# Patient Record
Sex: FEMALE | Race: WHITE | NOT HISPANIC OR LATINO | Employment: FULL TIME | URBAN - NONMETROPOLITAN AREA
[De-identification: names, ages, dates, MRNs, and addresses within clinical notes are randomized per-mention and may not be internally consistent; named-entity substitution may affect disease eponyms.]

---

## 2017-06-06 ENCOUNTER — OFFICE VISIT (OUTPATIENT)
Dept: FAMILY MEDICINE CLINIC | Facility: CLINIC | Age: 42
End: 2017-06-06

## 2017-06-06 VITALS
WEIGHT: 160 LBS | BODY MASS INDEX: 27.31 KG/M2 | OXYGEN SATURATION: 99 % | HEIGHT: 64 IN | HEART RATE: 74 BPM | SYSTOLIC BLOOD PRESSURE: 110 MMHG | DIASTOLIC BLOOD PRESSURE: 70 MMHG

## 2017-06-06 DIAGNOSIS — Z00.00 EXAMINATION, GENERAL MEDICAL: Primary | ICD-10-CM

## 2017-06-06 DIAGNOSIS — Z23 NEED FOR VACCINATION: ICD-10-CM

## 2017-06-06 PROCEDURE — 90691 TYPHOID VACCINE IM: CPT | Performed by: FAMILY MEDICINE

## 2017-06-06 PROCEDURE — 90472 IMMUNIZATION ADMIN EACH ADD: CPT | Performed by: FAMILY MEDICINE

## 2017-06-06 PROCEDURE — 90471 IMMUNIZATION ADMIN: CPT | Performed by: FAMILY MEDICINE

## 2017-06-06 PROCEDURE — 90632 HEPA VACCINE ADULT IM: CPT | Performed by: FAMILY MEDICINE

## 2017-06-06 PROCEDURE — 99212 OFFICE O/P EST SF 10 MIN: CPT | Performed by: FAMILY MEDICINE

## 2017-06-06 RX ORDER — CIPROFLOXACIN 500 MG/1
TABLET, FILM COATED ORAL
Qty: 10 TABLET | Refills: 0 | Status: SHIPPED | OUTPATIENT
Start: 2017-06-06 | End: 2017-06-20

## 2017-06-06 NOTE — PATIENT INSTRUCTIONS
I discussed food and water precautions.  I discussed blood borne pathogen avoidance.  I discussed indications for use of antibiotics for traveler's diarrhea.  I discussed avoidance of Rabies by avoiding the carriers.  I prescribed an antibiotic for traveler's diarrhea.    Hepatitis A and typhoid vaccines administered.

## 2017-06-06 NOTE — PROGRESS NOTES
Subjective   Muna Brumfield is a 41 y.o. female.     History of Present Illness Patient comes in for a traveler's health visit. They are going to [facility] for 10 days on a mission trip.She will need Hepatitis A and Typhoid vaccines.    The following portions of the patient's history were reviewed and updated as appropriate: allergies, current medications, past family history, past medical history, past social history, past surgical history and problem list.    Review of Systems    Objective   Physical Exam   Constitutional: She appears well-developed and well-nourished.   HENT:   Head: Normocephalic and atraumatic.   Right Ear: External ear normal.   Left Ear: External ear normal.   Nose: Nose normal.   Mouth/Throat: Oropharynx is clear and moist.   Eyes: Pupils are equal, round, and reactive to light.   Cardiovascular: Normal rate, regular rhythm and normal heart sounds.  Exam reveals no gallop and no friction rub.    No murmur heard.  Pulmonary/Chest: Effort normal and breath sounds normal. No respiratory distress. She has no wheezes. She has no rales.   Vitals reviewed.      Assessment/Plan   Muna was seen today for annual exam.    Diagnoses and all orders for this visit:    Examination, general medical    Need for vaccination    Other orders  -     ciprofloxacin (CIPRO) 500 MG tablet; Take one every 12 hours times three doses for Traveler's Diarrhea.  -     Typhoid VICPS Vaccine Im  -     Hepatitis A Vaccine Adult IM      I discussed food and water precautions.  I discussed blood borne pathogen avoidance.  I discussed indications for use of antibiotics for traveler's diarrhea.  I discussed avoidance of Rabies by avoiding the carriers.  Patient will not need antimalarials due to the location of where she is traveling.  I prescribed an antibiotic for traveler's diarrhea.

## 2017-06-20 ENCOUNTER — PROCEDURE VISIT (OUTPATIENT)
Dept: OBSTETRICS AND GYNECOLOGY | Facility: CLINIC | Age: 42
End: 2017-06-20

## 2017-06-20 VITALS
WEIGHT: 160 LBS | DIASTOLIC BLOOD PRESSURE: 56 MMHG | BODY MASS INDEX: 27.31 KG/M2 | HEIGHT: 64 IN | SYSTOLIC BLOOD PRESSURE: 105 MMHG | HEART RATE: 71 BPM

## 2017-06-20 DIAGNOSIS — N92.1 MENORRHAGIA WITH IRREGULAR CYCLE: ICD-10-CM

## 2017-06-20 DIAGNOSIS — Z01.419 WELL FEMALE EXAM WITH ROUTINE GYNECOLOGICAL EXAM: Primary | ICD-10-CM

## 2017-06-20 DIAGNOSIS — N94.6 DYSMENORRHEA: ICD-10-CM

## 2017-06-20 DIAGNOSIS — Z12.31 SCREENING MAMMOGRAM, ENCOUNTER FOR: ICD-10-CM

## 2017-06-20 DIAGNOSIS — R63.5 WEIGHT GAIN: ICD-10-CM

## 2017-06-20 PROCEDURE — 88142 CYTOPATH C/V THIN LAYER: CPT | Performed by: NURSE PRACTITIONER

## 2017-06-20 PROCEDURE — 87624 HPV HI-RISK TYP POOLED RSLT: CPT | Performed by: NURSE PRACTITIONER

## 2017-06-20 PROCEDURE — 99396 PREV VISIT EST AGE 40-64: CPT | Performed by: NURSE PRACTITIONER

## 2017-06-20 NOTE — PROGRESS NOTES
Subjective   Muna Brumfield is a 41 y.o.  who presents today for an annual GYN visit.      Gynecologic Exam   The patient's primary symptoms include pelvic pain. The patient's pertinent negatives include no genital itching, genital lesions, genital odor, genital rash, missed menses, vaginal bleeding or vaginal discharge. This is a new (Reports having irregular peiods since January and the pain with these periods is more significant than when she was having regular periods.  ) problem. The current episode started more than 1 month ago. The problem has been unchanged. The pain is moderate. The problem affects both sides. She is not pregnant. Pertinent negatives include no abdominal pain, back pain, chills, constipation, dysuria, fever, flank pain, headaches, nausea or painful intercourse. Associated symptoms comments: Lower pelvic pain and cramping. Nothing aggravates the symptoms. She has tried NSAIDs for the symptoms. The treatment provided mild relief. She is sexually active. No, her partner does not have an STD. She uses vasectomy for contraception. Her menstrual history has been irregular (Reports abnormal periods since January.  Prior to January she had regular montly cyles , with 5-7 days of normal bleeding. ). There is no history of an abdominal surgery, an ectopic pregnancy, endometriosis or a gynecological surgery.     Last Mammo- never  Last Pap-  14- normal.  Hx of abnormal pap in  that required a colpo which was normal.   LMP- 2 weeks ago.  Reports she had regular monthly cycles with 5-7 days of moderate bleeding until this January.  Starting in January she has had irregular cycles coming every 2-4 weeks.  She has noticed that her pain, cramping, and bleeding have gotten worse with periods.  She has to take advil routinely during menses to control her pain. She reports that her bleeding is no heavier than usual and would consider it normal.      The following portions of the patient's history  were reviewed and updated as appropriate: allergies, current medications, past family history, past medical history, past social history, past surgical history and problem list.    Review of Systems   Constitutional: Positive for fatigue. Negative for activity change, chills and fever.        Reports that over the last couple of months she has needed to take a nap at least once a day to get through the day. Patient also reports that for the last couple of years she has gained 5lbs a year and recently went up 1 size in her clothes.    Respiratory: Negative for apnea, cough and shortness of breath.    Cardiovascular: Negative for chest pain and palpitations.   Gastrointestinal: Negative for abdominal distention, abdominal pain, constipation, nausea and rectal pain.   Endocrine: Negative for cold intolerance and heat intolerance.        Will occasionally get a hot flash that is not bothersome.    Genitourinary: Positive for menstrual problem and pelvic pain. Negative for difficulty urinating, dyspareunia, dysuria, flank pain, genital sores, missed menses, vaginal bleeding, vaginal discharge and vaginal pain.   Musculoskeletal: Negative for arthralgias, back pain and myalgias.   Skin: Negative for color change.   Neurological: Negative for dizziness, syncope, light-headedness and headaches.   Hematological: Negative for adenopathy. Does not bruise/bleed easily.   Psychiatric/Behavioral: Negative for agitation, decreased concentration and dysphoric mood. The patient is not nervous/anxious and is not hyperactive.        Objective   Physical Exam   Constitutional: She is oriented to person, place, and time. She appears well-developed.   Neck: Normal range of motion.   Cardiovascular: Normal rate and regular rhythm.    Pulmonary/Chest: Effort normal. Right breast exhibits no inverted nipple, no mass, no nipple discharge, no skin change and no tenderness. Left breast exhibits no inverted nipple, no mass, no skin change and  no tenderness. Breasts are symmetrical. There is no breast swelling.   Fibrocystic breast changes noted bilaterally.    Abdominal: Hernia confirmed negative in the right inguinal area and confirmed negative in the left inguinal area.   Genitourinary: No breast tenderness, discharge or bleeding. Pelvic exam was performed with patient supine. No labial fusion. There is no rash, tenderness, lesion or injury on the right labia. There is no rash, tenderness, lesion or injury on the left labia. Uterus is not deviated, not enlarged, not fixed and not tender. Cervix exhibits no motion tenderness, no discharge and no friability. Right adnexum displays no mass, no tenderness and no fullness. Left adnexum displays no mass, no tenderness and no fullness. No erythema, tenderness or bleeding in the vagina. No foreign body in the vagina. No signs of injury around the vagina. Vaginal discharge found.   Genitourinary Comments: Pap smear obtained.  Scant amount of white/yellow cervical discharge noted.    Musculoskeletal: Normal range of motion.   Lymphadenopathy:        Right: No inguinal adenopathy present.        Left: No inguinal adenopathy present.   Neurological: She is alert and oriented to person, place, and time. She has normal reflexes.   Skin: Skin is warm.   Psychiatric: She has a normal mood and affect. Her behavior is normal.   Nursing note and vitals reviewed.        Assessment/Plan   Muna was seen today for gynecologic exam.    Diagnoses and all orders for this visit:    Well female exam with routine gynecological exam  -     Liquid-based Pap Smear, Screening    Screening mammogram, encounter for  -     Mammo Screening Digital Tomosynthesis Bilateral With CAD    Menorrhagia with irregular cycle  -     TSH  -     T3  -     T4, Free  -     US Non-ob Transvaginal; Future    Weight gain  -     TSH  -     T3  -     T4, Free    Dysmenorrhea  -     US Non-ob Transvaginal; Future      Different options to control bleeding  were discussed with the patient.  She declined OCPs because of the effects on her libido.  A pelvic US was also discussed to r/o the possibility of any fibroids, cysts or polyps that could be causing her pain.  The patient was also given information on an endometrial ablation and Essure.  She would like time to consider these options because she is concerned insurance will not cover these procedures.  Thyroid panel will also bed check today to r/o the possibility of a thyroid dysfunction causing her bleeding and recent c/o fatigue and weight gain.

## 2017-06-28 LAB
LAB AP CASE REPORT: NORMAL
LAB AP GYN ADDITIONAL INFORMATION: NORMAL
LAB AP GYN OTHER FINDINGS: NORMAL
Lab: NORMAL
PATH INTERP SPEC-IMP: NORMAL
STAT OF ADQ CVX/VAG CYTO-IMP: NORMAL

## 2017-06-30 LAB — HPV I/H RISK 4 DNA CVX QL PROBE+SIG AMP: NEGATIVE

## 2018-09-04 DIAGNOSIS — Z12.31 ENCOUNTER FOR SCREENING MAMMOGRAM FOR BREAST CANCER: Primary | ICD-10-CM

## 2019-02-26 ENCOUNTER — PROCEDURE VISIT (OUTPATIENT)
Dept: OBSTETRICS AND GYNECOLOGY | Facility: CLINIC | Age: 44
End: 2019-02-26

## 2019-02-26 VITALS
SYSTOLIC BLOOD PRESSURE: 115 MMHG | HEIGHT: 64 IN | DIASTOLIC BLOOD PRESSURE: 68 MMHG | HEART RATE: 78 BPM | BODY MASS INDEX: 26.7 KG/M2 | WEIGHT: 156.4 LBS

## 2019-02-26 DIAGNOSIS — N94.6 DYSMENORRHEA: ICD-10-CM

## 2019-02-26 DIAGNOSIS — Z01.419 ENCOUNTER FOR ANNUAL ROUTINE GYNECOLOGICAL EXAMINATION: Primary | ICD-10-CM

## 2019-02-26 DIAGNOSIS — N81.2 SECOND DEGREE UTERINE PROLAPSE: ICD-10-CM

## 2019-02-26 DIAGNOSIS — R53.83 LETHARGY: ICD-10-CM

## 2019-02-26 DIAGNOSIS — N92.0 MENORRHAGIA WITH REGULAR CYCLE: ICD-10-CM

## 2019-02-26 DIAGNOSIS — F34.1 DYSTHYMIC: ICD-10-CM

## 2019-02-26 DIAGNOSIS — E66.3 OVERWEIGHT (BMI 25.0-29.9): ICD-10-CM

## 2019-02-26 PROCEDURE — 99396 PREV VISIT EST AGE 40-64: CPT | Performed by: OBSTETRICS & GYNECOLOGY

## 2019-02-26 PROCEDURE — 87624 HPV HI-RISK TYP POOLED RSLT: CPT | Performed by: OBSTETRICS & GYNECOLOGY

## 2019-02-26 PROCEDURE — G0123 SCREEN CERV/VAG THIN LAYER: HCPCS | Performed by: OBSTETRICS & GYNECOLOGY

## 2019-02-26 RX ORDER — TRANEXAMIC ACID 650 MG/1
2 TABLET ORAL 3 TIMES DAILY
Qty: 30 TABLET | Refills: 12 | Status: SHIPPED | OUTPATIENT
Start: 2019-02-26 | End: 2022-01-24

## 2019-02-28 LAB
GEN CATEG CVX/VAG CYTO-IMP: NORMAL
LAB AP CASE REPORT: NORMAL
LAB AP GYN ADDITIONAL INFORMATION: NORMAL
PATH INTERP SPEC-IMP: NORMAL
STAT OF ADQ CVX/VAG CYTO-IMP: NORMAL

## 2019-03-04 LAB — HPV I/H RISK 4 DNA CVX QL PROBE+SIG AMP: NEGATIVE

## 2019-03-08 ENCOUNTER — APPOINTMENT (OUTPATIENT)
Dept: LAB | Facility: HOSPITAL | Age: 44
End: 2019-03-08

## 2019-03-08 LAB
25(OH)D3 SERPL-MCNC: 37.6 NG/ML (ref 30–100)
ALBUMIN SERPL-MCNC: 4 G/DL (ref 3.4–4.8)
ALBUMIN/GLOB SERPL: 1.1 G/DL (ref 1.1–1.8)
ALP SERPL-CCNC: 47 U/L (ref 38–126)
ALT SERPL W P-5'-P-CCNC: 23 U/L (ref 9–52)
ANION GAP SERPL CALCULATED.3IONS-SCNC: 6 MMOL/L (ref 5–15)
ARTICHOKE IGE QN: 81 MG/DL (ref 1–129)
AST SERPL-CCNC: 29 U/L (ref 14–36)
BASOPHILS # BLD AUTO: 0.03 10*3/MM3 (ref 0–0.2)
BASOPHILS NFR BLD AUTO: 0.5 % (ref 0–1.5)
BILIRUB SERPL-MCNC: 0.5 MG/DL (ref 0.2–1.3)
BUN BLD-MCNC: 18 MG/DL (ref 7–21)
BUN/CREAT SERPL: 23.7 (ref 7–25)
CALCIUM SPEC-SCNC: 9.2 MG/DL (ref 8.4–10.2)
CHLORIDE SERPL-SCNC: 99 MMOL/L (ref 95–110)
CHOLEST SERPL-MCNC: 158 MG/DL (ref 0–199)
CO2 SERPL-SCNC: 31 MMOL/L (ref 22–31)
CREAT BLD-MCNC: 0.76 MG/DL (ref 0.5–1)
DEPRECATED RDW RBC AUTO: 42 FL (ref 37–54)
EOSINOPHIL # BLD AUTO: 0.06 10*3/MM3 (ref 0–0.4)
EOSINOPHIL NFR BLD AUTO: 0.9 % (ref 0.3–6.2)
ERYTHROCYTE [DISTWIDTH] IN BLOOD BY AUTOMATED COUNT: 12 % (ref 12.3–15.4)
FOLATE SERPL-MCNC: 12.4 NG/ML (ref 2.76–21)
GFR SERPL CREATININE-BSD FRML MDRD: 83 ML/MIN/1.73 (ref 58–135)
GLOBULIN UR ELPH-MCNC: 3.5 GM/DL (ref 2.3–3.5)
GLUCOSE BLD-MCNC: 82 MG/DL (ref 60–100)
HCT VFR BLD AUTO: 40.9 % (ref 34–46.6)
HDLC SERPL-MCNC: 56 MG/DL (ref 60–200)
HGB BLD-MCNC: 14 G/DL (ref 12–15.9)
IMM GRANULOCYTES # BLD AUTO: 0.05 10*3/MM3 (ref 0–0.05)
IMM GRANULOCYTES NFR BLD AUTO: 0.8 % (ref 0–0.5)
LDLC/HDLC SERPL: 1.66 {RATIO} (ref 0–3.22)
LYMPHOCYTES # BLD AUTO: 1.63 10*3/MM3 (ref 0.7–3.1)
LYMPHOCYTES NFR BLD AUTO: 25.3 % (ref 19.6–45.3)
MCH RBC QN AUTO: 32.8 PG (ref 26.6–33)
MCHC RBC AUTO-ENTMCNC: 34.2 G/DL (ref 31.5–35.7)
MCV RBC AUTO: 95.8 FL (ref 79–97)
MONOCYTES # BLD AUTO: 0.74 10*3/MM3 (ref 0.1–0.9)
MONOCYTES NFR BLD AUTO: 11.5 % (ref 5–12)
NEUTROPHILS # BLD AUTO: 3.93 10*3/MM3 (ref 1.4–7)
NEUTROPHILS NFR BLD AUTO: 61 % (ref 42.7–76)
NRBC BLD AUTO-RTO: 0 /100 WBC (ref 0–0)
PLATELET # BLD AUTO: 286 10*3/MM3 (ref 140–450)
PMV BLD AUTO: 9.7 FL (ref 6–12)
POTASSIUM BLD-SCNC: 4.4 MMOL/L (ref 3.5–5.1)
PROT SERPL-MCNC: 7.5 G/DL (ref 6.3–8.6)
RBC # BLD AUTO: 4.27 10*6/MM3 (ref 3.77–5.28)
SODIUM BLD-SCNC: 136 MMOL/L (ref 137–145)
T4 SERPL-MCNC: 6.88 MCG/DL (ref 5.5–11)
TRIGL SERPL-MCNC: 45 MG/DL (ref 20–199)
TSH SERPL DL<=0.05 MIU/L-ACNC: 2.2 MIU/ML (ref 0.46–4.68)
VIT B12 BLD-MCNC: 643 PG/ML (ref 239–931)
WBC NRBC COR # BLD: 6.44 10*3/MM3 (ref 3.4–10.8)

## 2019-03-08 PROCEDURE — 80061 LIPID PANEL: CPT | Performed by: OBSTETRICS & GYNECOLOGY

## 2019-03-08 PROCEDURE — 85025 COMPLETE CBC W/AUTO DIFF WBC: CPT | Performed by: OBSTETRICS & GYNECOLOGY

## 2019-03-08 PROCEDURE — 82306 VITAMIN D 25 HYDROXY: CPT | Performed by: OBSTETRICS & GYNECOLOGY

## 2019-03-08 PROCEDURE — 84436 ASSAY OF TOTAL THYROXINE: CPT | Performed by: OBSTETRICS & GYNECOLOGY

## 2019-03-08 PROCEDURE — 82746 ASSAY OF FOLIC ACID SERUM: CPT | Performed by: OBSTETRICS & GYNECOLOGY

## 2019-03-08 PROCEDURE — 36415 COLL VENOUS BLD VENIPUNCTURE: CPT | Performed by: OBSTETRICS & GYNECOLOGY

## 2019-03-08 PROCEDURE — 82607 VITAMIN B-12: CPT | Performed by: OBSTETRICS & GYNECOLOGY

## 2019-03-08 PROCEDURE — 84443 ASSAY THYROID STIM HORMONE: CPT | Performed by: OBSTETRICS & GYNECOLOGY

## 2019-03-08 PROCEDURE — 84479 ASSAY OF THYROID (T3 OR T4): CPT | Performed by: OBSTETRICS & GYNECOLOGY

## 2019-03-08 PROCEDURE — 80053 COMPREHEN METABOLIC PANEL: CPT | Performed by: OBSTETRICS & GYNECOLOGY

## 2019-03-08 PROCEDURE — 83525 ASSAY OF INSULIN: CPT | Performed by: OBSTETRICS & GYNECOLOGY

## 2019-03-09 LAB
INSULIN SERPL-ACNC: 8.3 UIU/ML (ref 2.6–24.9)
T3RU NFR SERPL: 25 % (ref 24–39)

## 2020-06-23 DIAGNOSIS — Z12.31 ENCOUNTER FOR SCREENING MAMMOGRAM FOR MALIGNANT NEOPLASM OF BREAST: Primary | ICD-10-CM

## 2022-01-24 ENCOUNTER — OFFICE VISIT (OUTPATIENT)
Dept: FAMILY MEDICINE CLINIC | Facility: CLINIC | Age: 47
End: 2022-01-24

## 2022-01-24 VITALS
SYSTOLIC BLOOD PRESSURE: 98 MMHG | HEIGHT: 64 IN | DIASTOLIC BLOOD PRESSURE: 68 MMHG | OXYGEN SATURATION: 99 % | HEART RATE: 81 BPM | BODY MASS INDEX: 28.17 KG/M2 | WEIGHT: 165 LBS

## 2022-01-24 DIAGNOSIS — Z00.00 ANNUAL PHYSICAL EXAM: Primary | ICD-10-CM

## 2022-01-24 DIAGNOSIS — Z13.6 ENCOUNTER FOR LIPID SCREENING FOR CARDIOVASCULAR DISEASE: ICD-10-CM

## 2022-01-24 DIAGNOSIS — Z83.49 FAMILY HISTORY OF THYROID DISORDER: ICD-10-CM

## 2022-01-24 DIAGNOSIS — E55.9 VITAMIN D DEFICIENCY: ICD-10-CM

## 2022-01-24 DIAGNOSIS — Z76.89 ENCOUNTER TO ESTABLISH CARE: ICD-10-CM

## 2022-01-24 DIAGNOSIS — Z13.220 ENCOUNTER FOR LIPID SCREENING FOR CARDIOVASCULAR DISEASE: ICD-10-CM

## 2022-01-24 DIAGNOSIS — Z11.59 NEED FOR HEPATITIS C SCREENING TEST: ICD-10-CM

## 2022-01-24 DIAGNOSIS — Z80.8 FAMILY HISTORY OF SKIN CANCER: ICD-10-CM

## 2022-01-24 DIAGNOSIS — Z12.31 ENCOUNTER FOR SCREENING MAMMOGRAM FOR MALIGNANT NEOPLASM OF BREAST: ICD-10-CM

## 2022-01-24 DIAGNOSIS — E66.3 OVERWEIGHT (BMI 25.0-29.9): ICD-10-CM

## 2022-01-24 DIAGNOSIS — Z12.11 ENCOUNTER FOR SCREENING FOR MALIGNANT NEOPLASM OF COLON: ICD-10-CM

## 2022-01-24 PROCEDURE — 99386 PREV VISIT NEW AGE 40-64: CPT | Performed by: FAMILY MEDICINE

## 2022-01-24 NOTE — PROGRESS NOTES
Chief Complaint  Establish Care and Hemorrhoids (flares up, not current)    Subjective          Muna Brumfield presents to [facility] - [place]  History of Present Illness    Patient presents today to establish care.  She has chronic medical problems including vitamin D deficiency and hemorrhoids.  Patient takes vitamin D supplementation, unsure of exact dose but believes it is somewhere between 5000 to 10,000 units a couple times a week.  Patient has not had any lab work checked time.  Had issues with hemorrhoids, however improved since stopping nuts, berries and seeds.  Patient notes regular bowel movements without straining.  Has bowel movements generally daily.  Does use Aquaphor to help with irritation.    Patient had normal last Pap smear in 2019.  History of HPV with precancerous lesions.  Had procedure in office greater than 25 years ago.  Patient has history of fibrocystic breasts.  Had lumpectomy (final diagnosis fibroadenoma) history of breast cyst requiring evaluation which has resolved.    Past Medical History:   Diagnosis Date   • Breast cyst    • Dysmenorrhea     Improved   • Encounter for gynecological examination (general) (routine) without abnormal findings    • Fibroadenoma of breast    • Fibrocystic breast    • Visit for gynecologic examination    • Babb teeth removed      Past Surgical History:   Procedure Laterality Date   • BREAST BIOPSY     • BREAST CYST ASPIRATION     • BREAST CYST EXCISION     • BREAST SURGERY Right     lumpectomy, right   • COLPOSCOPY      EXAM OF CERVIX W/SCOPE 97887 (1)   Normal   • PAP SMEAR  02/28/2011    Negative   • VAGINAL DELIVERY  08/2006     Family History   Problem Relation Age of Onset   • Hyperlipidemia Mother    • Hypertension Mother    • Hypothyroidism Mother    • Arthritis Mother    • DELORES disease Mother    • Hyperlipidemia Father    • Arthritis Father    • Ovarian cancer Other         Other 3rd degree relative  "()   • Ovarian cancer Maternal Aunt    • No Known Problems Brother    • ADD / ADHD Daughter    • Anxiety disorder Son    • Heart attack Maternal Grandfather    • Stroke Paternal Grandmother      Current Outpatient Medications on File Prior to Visit   Medication Sig Dispense Refill   • [DISCONTINUED] Tranexamic Acid 650 MG tablet Take 2 tablets by mouth 3 (Three) Times a Day. For 5 days each cycle 30 tablet 12     No current facility-administered medications on file prior to visit.     Allergies   Allergen Reactions   • Penicillins      Social History     Socioeconomic History   • Marital status:    Tobacco Use   • Smoking status: Never Smoker   • Smokeless tobacco: Never Used   Substance and Sexual Activity   • Alcohol use: No   • Drug use: No   • Sexual activity: Yes     Birth control/protection: None     She lives in home with , two children, dog and 2 cats.  Patient teaches high school Urdu.    Objective   Vital Signs:   BP 98/68   Pulse 81   Ht 162.6 cm (64\")   Wt 74.8 kg (165 lb)   SpO2 99%   BMI 28.32 kg/m²     Physical Exam  Vitals reviewed.   Constitutional:       General: She is not in acute distress.     Appearance: She is well-developed.   HENT:      Head: Normocephalic and atraumatic.      Right Ear: Tympanic membrane and ear canal normal.      Left Ear: Tympanic membrane and ear canal normal.   Eyes:      Extraocular Movements: Extraocular movements intact.      Conjunctiva/sclera: Conjunctivae normal.      Pupils: Pupils are equal, round, and reactive to light.   Cardiovascular:      Rate and Rhythm: Normal rate and regular rhythm.      Heart sounds: Normal heart sounds. No murmur heard.      Pulmonary:      Effort: Pulmonary effort is normal. No respiratory distress.      Breath sounds: Normal breath sounds. No wheezing or rales.   Abdominal:      Palpations: Abdomen is soft.      Tenderness: There is no abdominal tenderness.   Musculoskeletal:      Cervical back: Neck " supple.   Skin:     General: Skin is warm and dry.      Findings: No rash.   Neurological:      Mental Status: She is alert and oriented to person, place, and time.        Result Review :   The following data was reviewed by: Makenzie George MD on 01/24/2022:      Lab Results   Component Value Date    GLUCOSE 82 03/08/2019    BUN 18 03/08/2019    CREATININE 0.76 03/08/2019    EGFRIFNONA 83 03/08/2019    BCR 23.7 03/08/2019    K 4.4 03/08/2019    CO2 31.0 03/08/2019    CALCIUM 9.2 03/08/2019    ALBUMIN 4.00 03/08/2019    AST 29 03/08/2019    ALT 23 03/08/2019     Lab Results   Component Value Date    WBC 6.44 03/08/2019    HGB 14.0 03/08/2019    HCT 40.9 03/08/2019    MCV 95.8 03/08/2019     03/08/2019     Lab Results   Component Value Date    TSH 2.200 03/08/2019               Assessment and Plan    Diagnoses and all orders for this visit:    1. Annual physical exam (Primary)  -     Lipid Panel; Future  -     CBC & Differential; Future  -     Comprehensive Metabolic Panel; Future  -     Hepatitis C antibody; Future  -     TSH; Future  -     Vitamin D 25 Hydroxy; Future    2. Vitamin D deficiency  -     Vitamin D 25 Hydroxy; Future    3. Overweight (BMI 25.0-29.9)  -     Lipid Panel; Future  -     CBC & Differential; Future  -     Comprehensive Metabolic Panel; Future    4. Encounter for lipid screening for cardiovascular disease  -     Lipid Panel; Future    5. Encounter for screening mammogram for malignant neoplasm of breast  -     Mammo Screening Digital Tomosynthesis Bilateral With CAD; Future    6. Encounter for screening for malignant neoplasm of colon  -     Cologuard - Stool, Per Rectum; Future    7. Family history of skin cancer  -     Ambulatory Referral to Dermatology    8. Family history of thyroid disorder  -     TSH; Future    9. Need for hepatitis C screening test  -     Hepatitis C antibody; Future    10. Encounter to establish care      Patient seen today to establish care.  Annual exam  completed.  Will check CBC, CMP, TSH and lipid panel.  History of D deficiency, continue supplementation.  Will check vitamin D level as well.  Patient's Body mass index is 28.32 kg/m². indicating that she is overweight (BMI 25-29.9). Obesity-related health conditions include the following: dyslipidemias.  Weight is unchanged. BMI is is above average; BMI management plan is completed. We discussed Continuing healthy diet and regular physical activity.  Patient due for breast cancer screening, mammogram ordered.  Patient due for colon cancer screening, options discussed including risk and benefits of each screening test. Cologuard ordered.  No family history of breast or colon cancer.  Patient does have family history of skin cancer, thinks basal cell in father.  Will refer to dermatology for skin check.  Due for hepatitis C screening, antibody ordered today.  Will contact patient with labs and recommendations once available.            Follow Up   Return in about 6 weeks (around 3/7/2022) for  procedure only, gyn exam, 30 mins.  Patient was given instructions and counseling regarding her condition or for health maintenance advice. Please see specific information pulled into the AVS if appropriate.           This document has been electronically signed by Makenzie George MD

## 2022-01-27 ENCOUNTER — PATIENT ROUNDING (BHMG ONLY) (OUTPATIENT)
Dept: FAMILY MEDICINE CLINIC | Facility: CLINIC | Age: 47
End: 2022-01-27

## 2022-02-08 ENCOUNTER — TELEPHONE (OUTPATIENT)
Dept: FAMILY MEDICINE CLINIC | Facility: CLINIC | Age: 47
End: 2022-02-08

## 2022-02-08 NOTE — TELEPHONE ENCOUNTER
Per Dr. George, Ms. Brumfield has been called with recent Screening Mammogram results & recommendations.  Continue current medications and follow-up as planned or sooner if any problems.       ----- Message from Makenzie George MD sent at 2/8/2022 12:49 PM CST -----  Please call and let patient know that mammogram is normal.  Plan to repeat in 1 year.  Thanks, GENA George

## 2022-02-18 ENCOUNTER — LAB (OUTPATIENT)
Dept: LAB | Facility: HOSPITAL | Age: 47
End: 2022-02-18

## 2022-02-18 DIAGNOSIS — Z00.00 ANNUAL PHYSICAL EXAM: ICD-10-CM

## 2022-02-18 DIAGNOSIS — E66.3 OVERWEIGHT (BMI 25.0-29.9): ICD-10-CM

## 2022-02-18 DIAGNOSIS — E55.9 VITAMIN D DEFICIENCY: ICD-10-CM

## 2022-02-18 DIAGNOSIS — Z13.6 ENCOUNTER FOR LIPID SCREENING FOR CARDIOVASCULAR DISEASE: ICD-10-CM

## 2022-02-18 DIAGNOSIS — Z11.59 NEED FOR HEPATITIS C SCREENING TEST: ICD-10-CM

## 2022-02-18 DIAGNOSIS — Z13.220 ENCOUNTER FOR LIPID SCREENING FOR CARDIOVASCULAR DISEASE: ICD-10-CM

## 2022-02-18 DIAGNOSIS — Z83.49 FAMILY HISTORY OF THYROID DISORDER: ICD-10-CM

## 2022-02-18 LAB
25(OH)D3 SERPL-MCNC: 43.1 NG/ML (ref 30–100)
ALBUMIN SERPL-MCNC: 3.7 G/DL (ref 3.5–5.2)
ALBUMIN/GLOB SERPL: 1.1 G/DL
ALP SERPL-CCNC: 45 U/L (ref 39–117)
ALT SERPL W P-5'-P-CCNC: 13 U/L (ref 1–33)
ANION GAP SERPL CALCULATED.3IONS-SCNC: 4.5 MMOL/L (ref 5–15)
AST SERPL-CCNC: 17 U/L (ref 1–32)
BASOPHILS # BLD AUTO: 0.04 10*3/MM3 (ref 0–0.2)
BASOPHILS NFR BLD AUTO: 0.8 % (ref 0–1.5)
BILIRUB SERPL-MCNC: 0.5 MG/DL (ref 0–1.2)
BUN SERPL-MCNC: 16 MG/DL (ref 6–20)
BUN/CREAT SERPL: 17.8 (ref 7–25)
CALCIUM SPEC-SCNC: 9.4 MG/DL (ref 8.6–10.5)
CHLORIDE SERPL-SCNC: 104 MMOL/L (ref 98–107)
CHOLEST SERPL-MCNC: 167 MG/DL (ref 0–200)
CO2 SERPL-SCNC: 28.5 MMOL/L (ref 22–29)
CREAT SERPL-MCNC: 0.9 MG/DL (ref 0.57–1)
DEPRECATED RDW RBC AUTO: 40.7 FL (ref 37–54)
EOSINOPHIL # BLD AUTO: 0.07 10*3/MM3 (ref 0–0.4)
EOSINOPHIL NFR BLD AUTO: 1.4 % (ref 0.3–6.2)
ERYTHROCYTE [DISTWIDTH] IN BLOOD BY AUTOMATED COUNT: 11.8 % (ref 12.3–15.4)
GFR SERPL CREATININE-BSD FRML MDRD: 68 ML/MIN/1.73
GLOBULIN UR ELPH-MCNC: 3.5 GM/DL
GLUCOSE SERPL-MCNC: 93 MG/DL (ref 65–99)
HCT VFR BLD AUTO: 40.9 % (ref 34–46.6)
HCV AB SER DONR QL: NORMAL
HDLC SERPL-MCNC: 65 MG/DL (ref 40–60)
HGB BLD-MCNC: 14.1 G/DL (ref 12–15.9)
IMM GRANULOCYTES # BLD AUTO: 0.01 10*3/MM3 (ref 0–0.05)
IMM GRANULOCYTES NFR BLD AUTO: 0.2 % (ref 0–0.5)
LDLC SERPL CALC-MCNC: 94 MG/DL (ref 0–100)
LDLC/HDLC SERPL: 1.46 {RATIO}
LYMPHOCYTES # BLD AUTO: 1.11 10*3/MM3 (ref 0.7–3.1)
LYMPHOCYTES NFR BLD AUTO: 21.9 % (ref 19.6–45.3)
MCH RBC QN AUTO: 33.2 PG (ref 26.6–33)
MCHC RBC AUTO-ENTMCNC: 34.5 G/DL (ref 31.5–35.7)
MCV RBC AUTO: 96.2 FL (ref 79–97)
MONOCYTES # BLD AUTO: 0.48 10*3/MM3 (ref 0.1–0.9)
MONOCYTES NFR BLD AUTO: 9.4 % (ref 5–12)
NEUTROPHILS NFR BLD AUTO: 3.37 10*3/MM3 (ref 1.7–7)
NEUTROPHILS NFR BLD AUTO: 66.3 % (ref 42.7–76)
NRBC BLD AUTO-RTO: 0 /100 WBC (ref 0–0.2)
PLATELET # BLD AUTO: 295 10*3/MM3 (ref 140–450)
PMV BLD AUTO: 9.8 FL (ref 6–12)
POTASSIUM SERPL-SCNC: 4.6 MMOL/L (ref 3.5–5.2)
PROT SERPL-MCNC: 7.2 G/DL (ref 6–8.5)
RBC # BLD AUTO: 4.25 10*6/MM3 (ref 3.77–5.28)
SODIUM SERPL-SCNC: 137 MMOL/L (ref 136–145)
TRIGL SERPL-MCNC: 37 MG/DL (ref 0–150)
TSH SERPL DL<=0.05 MIU/L-ACNC: 2.01 UIU/ML (ref 0.27–4.2)
VLDLC SERPL-MCNC: 8 MG/DL (ref 5–40)
WBC NRBC COR # BLD: 5.08 10*3/MM3 (ref 3.4–10.8)

## 2022-02-18 PROCEDURE — 82306 VITAMIN D 25 HYDROXY: CPT

## 2022-02-18 PROCEDURE — 80061 LIPID PANEL: CPT

## 2022-02-18 PROCEDURE — 80050 GENERAL HEALTH PANEL: CPT

## 2022-02-18 PROCEDURE — 36415 COLL VENOUS BLD VENIPUNCTURE: CPT

## 2022-02-18 PROCEDURE — 86803 HEPATITIS C AB TEST: CPT

## 2022-02-24 ENCOUNTER — TELEPHONE (OUTPATIENT)
Dept: FAMILY MEDICINE CLINIC | Facility: CLINIC | Age: 47
End: 2022-02-24

## 2022-02-25 NOTE — TELEPHONE ENCOUNTER
Please let patient know the following labs:    - Vitamin D normal  - TSH normal  - Hep C antibody negative  - CMP and CBC ok  - Cholesterol is good      Thanks, GENA George

## 2022-02-25 NOTE — TELEPHONE ENCOUNTER
Per Dr. George, Mr. Brumfield has been called with recent lab results & recommendations.  Continue current medications and follow-up as planned or sooner if any problems.

## 2022-06-08 ENCOUNTER — OFFICE VISIT (OUTPATIENT)
Dept: FAMILY MEDICINE CLINIC | Facility: CLINIC | Age: 47
End: 2022-06-08

## 2022-06-08 VITALS
DIASTOLIC BLOOD PRESSURE: 72 MMHG | HEIGHT: 64 IN | OXYGEN SATURATION: 99 % | SYSTOLIC BLOOD PRESSURE: 104 MMHG | BODY MASS INDEX: 29.02 KG/M2 | HEART RATE: 76 BPM | WEIGHT: 170 LBS

## 2022-06-08 DIAGNOSIS — Z23 NEED FOR TDAP VACCINATION: ICD-10-CM

## 2022-06-08 DIAGNOSIS — Z00.00 ANNUAL PHYSICAL EXAM: Primary | ICD-10-CM

## 2022-06-08 DIAGNOSIS — E66.3 OVERWEIGHT (BMI 25.0-29.9): ICD-10-CM

## 2022-06-08 PROCEDURE — 90715 TDAP VACCINE 7 YRS/> IM: CPT | Performed by: FAMILY MEDICINE

## 2022-06-08 PROCEDURE — 90471 IMMUNIZATION ADMIN: CPT | Performed by: FAMILY MEDICINE

## 2022-06-08 PROCEDURE — 99396 PREV VISIT EST AGE 40-64: CPT | Performed by: FAMILY MEDICINE

## 2022-06-14 ENCOUNTER — PROCEDURE VISIT (OUTPATIENT)
Dept: FAMILY MEDICINE CLINIC | Facility: CLINIC | Age: 47
End: 2022-06-14

## 2022-06-14 VITALS
HEART RATE: 79 BPM | DIASTOLIC BLOOD PRESSURE: 70 MMHG | WEIGHT: 167 LBS | OXYGEN SATURATION: 99 % | SYSTOLIC BLOOD PRESSURE: 110 MMHG | HEIGHT: 64 IN | BODY MASS INDEX: 28.51 KG/M2

## 2022-06-14 DIAGNOSIS — Z01.419 NORMAL GYNECOLOGIC EXAMINATION: Primary | ICD-10-CM

## 2022-06-14 DIAGNOSIS — Z12.4 ENCOUNTER FOR PAPANICOLAOU SMEAR FOR CERVICAL CANCER SCREENING: ICD-10-CM

## 2022-06-14 PROCEDURE — 99396 PREV VISIT EST AGE 40-64: CPT | Performed by: FAMILY MEDICINE

## 2022-06-16 LAB — REF LAB TEST METHOD: NORMAL

## 2022-06-22 ENCOUNTER — TELEPHONE (OUTPATIENT)
Dept: FAMILY MEDICINE CLINIC | Facility: CLINIC | Age: 47
End: 2022-06-22

## 2022-06-22 NOTE — TELEPHONE ENCOUNTER
-Per Dr. George, Ms. Brumfield has been called with recent Pap Smear results & recommendations.  Continue current medications and follow-up as planned or sooner if any problems.       ---- Message from Makenzie George MD sent at 6/21/2022  8:08 AM CDT -----  Normal pap with negative HPV.  Plan to repeat in 5 years.  Thanks, GENA George

## 2022-11-29 ENCOUNTER — OFFICE VISIT (OUTPATIENT)
Dept: FAMILY MEDICINE CLINIC | Facility: CLINIC | Age: 47
End: 2022-11-29

## 2022-11-29 VITALS
RESPIRATION RATE: 24 BRPM | WEIGHT: 169.2 LBS | OXYGEN SATURATION: 99 % | BODY MASS INDEX: 28.89 KG/M2 | SYSTOLIC BLOOD PRESSURE: 112 MMHG | HEIGHT: 64 IN | DIASTOLIC BLOOD PRESSURE: 70 MMHG | HEART RATE: 65 BPM

## 2022-11-29 DIAGNOSIS — L03.032 ACUTE PARONYCHIA OF TOE OF LEFT FOOT: Primary | ICD-10-CM

## 2022-11-29 PROCEDURE — 99213 OFFICE O/P EST LOW 20 MIN: CPT | Performed by: NURSE PRACTITIONER

## 2022-11-29 RX ORDER — SULFAMETHOXAZOLE AND TRIMETHOPRIM 800; 160 MG/1; MG/1
1 TABLET ORAL 2 TIMES DAILY
Qty: 14 TABLET | Refills: 0 | Status: SHIPPED | OUTPATIENT
Start: 2022-11-29 | End: 2023-03-28

## 2023-03-28 ENCOUNTER — OFFICE VISIT (OUTPATIENT)
Dept: FAMILY MEDICINE CLINIC | Facility: CLINIC | Age: 48
End: 2023-03-28
Payer: COMMERCIAL

## 2023-03-28 VITALS — OXYGEN SATURATION: 98 % | BODY MASS INDEX: 28.22 KG/M2 | HEIGHT: 64 IN | WEIGHT: 165.3 LBS | HEART RATE: 67 BPM

## 2023-03-28 DIAGNOSIS — M62.838 MUSCLE SPASM: Primary | ICD-10-CM

## 2023-03-28 DIAGNOSIS — G58.9 PINCHED NERVE IN NECK: ICD-10-CM

## 2023-03-28 PROCEDURE — 99214 OFFICE O/P EST MOD 30 MIN: CPT | Performed by: NURSE PRACTITIONER

## 2023-03-28 RX ORDER — METHYLPREDNISOLONE 4 MG/1
TABLET ORAL
Qty: 21 EACH | Refills: 0 | Status: SHIPPED | OUTPATIENT
Start: 2023-03-28

## 2023-03-28 RX ORDER — MULTIPLE VITAMINS W/ MINERALS TAB 9MG-400MCG
1 TAB ORAL DAILY
COMMUNITY

## 2023-03-28 RX ORDER — MELOXICAM 15 MG/1
15 TABLET ORAL DAILY
Qty: 20 TABLET | Refills: 0 | Status: SHIPPED | OUTPATIENT
Start: 2023-03-28

## 2023-03-28 RX ORDER — CYCLOBENZAPRINE HCL 5 MG
5 TABLET ORAL 3 TIMES DAILY PRN
Qty: 90 TABLET | Refills: 0 | Status: SHIPPED | OUTPATIENT
Start: 2023-03-28

## 2023-03-28 NOTE — PROGRESS NOTES
"Chief Complaint  Pain (Neck pain)    Subjective          Muna Brumfield presents to [facility] [place] with concerns of neck pain that started Tuesday last week. She is unsure if she has slept wrong or in a weird position. She has been lifting weights and has noticed some tingling going down arm at times.       Pain  This is a new problem. The current episode started in the past 7 days. The problem occurs daily. Associated symptoms include myalgias. She has tried rest, heat and ice for the symptoms. The treatment provided mild relief.     Outpatient Medications Prior to Visit   Medication Sig Dispense Refill   • multivitamin with minerals (MULTIVITAMIN ADULT PO) Take 1 tablet by mouth Daily.     • vitamin D3 (Vitamin D) 125 MCG (5000 UT) capsule capsule Take 1 capsule by mouth 1 (One) Time Per Week.     • sulfamethoxazole-trimethoprim (Bactrim DS) 800-160 MG per tablet Take 1 tablet by mouth 2 (Two) Times a Day. 14 tablet 0     No facility-administered medications prior to visit.       Review of Systems   Musculoskeletal: Positive for myalgias.         Objective   Vital Signs:   Visit Vitals  Pulse 67   Ht 162.6 cm (64.02\")   Wt 75 kg (165 lb 4.8 oz)   LMP 03/16/2023 (Exact Date)   SpO2 98%   BMI 28.36 kg/m²     Physical Exam  Vitals and nursing note reviewed.   Constitutional:       Appearance: She is well-developed.   HENT:      Head: Normocephalic and atraumatic.   Eyes:      General: Lids are normal.      Conjunctiva/sclera: Conjunctivae normal.   Neck:      Thyroid: No thyroid mass or thyromegaly.      Trachea: Trachea normal. No tracheal tenderness.   Cardiovascular:      Rate and Rhythm: Normal rate.      Pulses: Normal pulses.      Heart sounds: Normal heart sounds.   Pulmonary:      Effort: Pulmonary effort is normal. No respiratory distress.      Breath sounds: Normal breath sounds. No wheezing.   Abdominal:      General: There is no distension.      " Palpations: Abdomen is soft. There is no mass.   Musculoskeletal:      Cervical back: No edema. Muscular tenderness present. Decreased range of motion.   Skin:     General: Skin is warm and dry.      Coloration: Skin is not pale.      Findings: No abrasion, erythema or lesion.   Neurological:      Mental Status: She is alert and oriented to person, place, and time.   Psychiatric:         Mood and Affect: Mood is not anxious. Affect is not inappropriate.         Speech: Speech normal.         Behavior: Behavior normal.         Thought Content: Thought content normal.         Judgment: Judgment normal. Judgment is not impulsive.        Result Review :                 Assessment and Plan    Diagnoses and all orders for this visit:    1. Muscle spasm (Primary)  -     meloxicam (Mobic) 15 MG tablet; Take 1 tablet by mouth Daily.  Dispense: 20 tablet; Refill: 0  -     methylPREDNISolone (MEDROL) 4 MG dose pack; Take as directed on package instructions.  Dispense: 21 each; Refill: 0  -     cyclobenzaprine (FLEXERIL) 5 MG tablet; Take 1 tablet by mouth 3 (Three) Times a Day As Needed for Muscle Spasms.  Dispense: 90 tablet; Refill: 0    2. Pinched nerve in neck  -     meloxicam (Mobic) 15 MG tablet; Take 1 tablet by mouth Daily.  Dispense: 20 tablet; Refill: 0  -     methylPREDNISolone (MEDROL) 4 MG dose pack; Take as directed on package instructions.  Dispense: 21 each; Refill: 0  -     cyclobenzaprine (FLEXERIL) 5 MG tablet; Take 1 tablet by mouth 3 (Three) Times a Day As Needed for Muscle Spasms.  Dispense: 90 tablet; Refill: 0      Start newly prescribed medications   Please call the office if you have issues       Discussed possible imaging if no improvement with treatments     Continue using heat/ice stretches     Please call the office if have any issues          Follow Up   Return if symptoms worsen or fail to improve, for Next scheduled follow up.  Patient was given instructions and counseling regarding her  condition or for health maintenance advice. Please see specific information pulled into the AVS if appropriate.           This document has been electronically signed by ANGELA Ramirez on March 29, 2023 08:15 CDT

## 2023-06-09 ENCOUNTER — PATIENT MESSAGE (OUTPATIENT)
Dept: FAMILY MEDICINE CLINIC | Facility: CLINIC | Age: 48
End: 2023-06-09

## 2023-06-13 ENCOUNTER — TELEPHONE (OUTPATIENT)
Dept: FAMILY MEDICINE CLINIC | Facility: CLINIC | Age: 48
End: 2023-06-13
Payer: COMMERCIAL

## 2023-06-13 NOTE — TELEPHONE ENCOUNTER
Per Dr. George, Ms. Brumfield has been called with recent C-Spine x-ray results & recommendations.  Continue current medications and follow-up as planned or sooner if any problems.     ----- Message from Makenzie George MD sent at 6/13/2023  3:39 PM CDT -----  Cervical spine Xray is normal.  No significant arthritis changes.  - GENA George

## 2023-07-27 ENCOUNTER — TELEPHONE (OUTPATIENT)
Dept: FAMILY MEDICINE CLINIC | Facility: CLINIC | Age: 48
End: 2023-07-27
Payer: COMMERCIAL

## 2023-07-27 NOTE — TELEPHONE ENCOUNTER
-Per Dr. George, Ms. Brumfield has been called with recent Right Diagnostic Mammogram results & recommendations.  Continue current medications and follow-up as planned or sooner if any problems.       ---- Message from Makenzie George MD sent at 7/27/2023 10:33 AM CDT -----  Please call and let patient know that diagnostic mammogram on the right is normal.  Plan to repeat in 1 year for normal screening.  Thanks, GENA George

## 2024-10-27 NOTE — PROGRESS NOTES
Muna Brumfield is a 43 y.o. y/o female.     Chief Complaint: Establish care, annual GYN exam Pap smear, severe dysmenorrhea, lethargy, and dysthymia    HPI:   43 y.o. y/o .  No LMP recorded..  Menarche age: 13    Past medical history: She states that she does not have any medical problems    Past surgical history: benign breast lump removed from left breast in .    She states that she is satisfied with her sexual function.    Current medications include Advil or Tylenol for menstrual cramps as needed.    She is allergic to penicillins.    She is  and is a  at [employer].    She does not smoke or use smokeless tobacco, and she never has.    Family history significant for mother age 67 with hypothyroidism and hip replacement.  Father age 67 whose had back surgery.  There is no family history of breast cancer, uterine cancer, ovarian cancer, or colon cancer.    Mammogram in 2018 was normal.    2019 156.4 pounds with BMI 26.9.  Pap smear was performed today.  She states that she has severe dysmenorrhea and that her periods come anywhere between every 3 weeks and every 5 weeks.  She bleeds for 4-6 days.  She wishes to have some blood work performed.  I suggested that she try Lysteda for her heavy painful periods, and she wishes to try this.  She does not want to take a daily medicine such as a birth control pill she has had problems with birth control pills in the past.  She does not want to have any kind of surgery.  She states that she does leak urine some with coughing and sneezing.    Obstetric History  #: 1, Date: 04, Sex: Male, Weight: 3487 g (7 lb 11 oz), GA: 40w0d, Delivery: Vaginal, Spontaneous, Apgar1: None, Apgar5: None, Living: Living, Birth Comments: None    #: 2, Date: 06, Sex: Female, Weight: 3884 g (8 lb 9 oz), GA: 40w0d, Delivery: Vaginal, Spontaneous, Apgar1: None, Apgar5: None, Living: Living, Birth Comments:  None      Review of Systems   Constitutional: Positive for fatigue. Negative for activity change, appetite change, chills, diaphoresis, fever and unexpected weight change.   Gastrointestinal: Negative for abdominal pain, constipation, diarrhea and nausea.   Genitourinary: Positive for menstrual problem. Negative for difficulty urinating, dyspareunia, dysuria, pelvic pain, urgency, vaginal bleeding, vaginal discharge and vaginal pain.   Neurological: Negative for headaches.   Psychiatric/Behavioral: Negative for dysphoric mood. The patient is not nervous/anxious.    All other systems reviewed and are negative.     Breast ROS: negative    The following portions of the patient's history were reviewed and updated as appropriate: allergies, current medications, past family history, past medical history, past social history, past surgical history and problem list.    Allergies   Allergen Reactions   • Penicillins         No outpatient medications prior to visit.     No facility-administered medications prior to visit.         The patient has a family history of   Family History   Problem Relation Age of Onset   • Hyperlipidemia Mother    • Hypertension Mother    • Hypothyroidism Mother    • Hyperlipidemia Father    • Ovarian cancer Other         Other 3rd degree relative ()   • Ovarian cancer Maternal Aunt         Past Medical History:   Diagnosis Date   • Breast cyst    • Dysmenorrhea     Improved   • Encounter for gynecological examination (general) (routine) without abnormal findings    • Fibroadenoma of breast    • Fibrocystic breast    • Visit for gynecologic examination         OB History      Para Term  AB Living    2 2 2     2    SAB TAB Ectopic Molar Multiple Live Births              2           Social History     Socioeconomic History   • Marital status:      Spouse name: Not on file   • Number of children: Not on file   • Years of education: Not on file   • Highest education level:  "Not on file   Social Needs   • Financial resource strain: Not on file   • Food insecurity - worry: Not on file   • Food insecurity - inability: Not on file   • Transportation needs - medical: Not on file   • Transportation needs - non-medical: Not on file   Occupational History   • Not on file   Tobacco Use   • Smoking status: Never Smoker   • Smokeless tobacco: Never Used   Substance and Sexual Activity   • Alcohol use: No   • Drug use: No   • Sexual activity: Yes     Birth control/protection: None   Other Topics Concern   • Not on file   Social History Narrative   • Not on file        Past Surgical History:   Procedure Laterality Date   • BREAST BIOPSY     • BREAST CYST ASPIRATION     • BREAST CYST EXCISION     • BREAST SURGERY Right     lumpectomy, right   • COLPOSCOPY      EXAM OF CERVIX W/SCOPE 67488 (1)   Normal   • PAP SMEAR  02/28/2011    Negative   • VAGINAL DELIVERY  08/2006        Patient Active Problem List   Diagnosis   • Menorrhagia with irregular cycle   • Dysmenorrhea   • Dysthymic   • Second degree uterine prolapse   • Overweight (BMI 25.0-29.9)        Documented Vitals    02/26/19 1524   BP: 115/68   Pulse: 78   Weight: 70.9 kg (156 lb 6.4 oz)   Height: 162.6 cm (64\")        Body mass index is 26.85 kg/m².    Physical Exam   Constitutional: She is oriented to person, place, and time. No distress.   Lightly overweight white female weighing 156.4 pounds with BMI 26.9.  Blood pressure 115/68.  Pulse 78.   HENT:   Head: Normocephalic and atraumatic.   Eyes: Conjunctivae and EOM are normal. Pupils are equal, round, and reactive to light.   Neck: Normal range of motion. Neck supple. No JVD present. No tracheal deviation present. No thyromegaly present.   Cardiovascular: Normal rate, regular rhythm, normal heart sounds and intact distal pulses. Exam reveals no gallop and no friction rub.   No murmur heard.  Pulmonary/Chest: Effort normal and breath sounds normal. No stridor. No respiratory distress. She " has no wheezes. She has no rales. She exhibits no tenderness. Right breast exhibits no inverted nipple, no mass, no nipple discharge, no skin change and no tenderness. Left breast exhibits no inverted nipple, no mass, no nipple discharge, no skin change and no tenderness. Breasts are symmetrical. There is no breast swelling.   Abdominal: Soft. Bowel sounds are normal. She exhibits no distension and no mass. There is no tenderness. There is no rebound and no guarding. No hernia. Hernia confirmed negative in the right inguinal area and confirmed negative in the left inguinal area.   Genitourinary: Vagina normal and uterus normal. Rectal exam shows no external hemorrhoid. No breast tenderness, discharge or bleeding. No labial fusion. There is no rash, tenderness, lesion or injury on the right labia. There is no rash, tenderness, lesion or injury on the left labia. Uterus is not deviated, not enlarged, not fixed and not tender. Cervix exhibits no motion tenderness, no discharge and no friability. Right adnexum displays no mass, no tenderness and no fullness. Left adnexum displays no mass, no tenderness and no fullness. No vaginal discharge found.   Genitourinary Comments: Pap smear of the cervix performed.  Second-degree cystocele  Second-degree rectocele  Second-degree uterine prolapse   Musculoskeletal: Normal range of motion. She exhibits no edema, tenderness or deformity.   Lymphadenopathy:     She has no cervical adenopathy.        Right: No inguinal adenopathy present.        Left: No inguinal adenopathy present.   Neurological: She is alert and oriented to person, place, and time. She has normal reflexes. She displays normal reflexes. No cranial nerve deficit. She exhibits normal muscle tone. Coordination normal.   Skin: Skin is warm and dry. No rash noted. She is not diaphoretic. No erythema. No pallor.   Psychiatric: She has a normal mood and affect. Her behavior is normal. Judgment and thought content normal.    Nursing note and vitals reviewed.      Assessment        Diagnosis Plan   1. Encounter for annual routine gynecological examination  Liquid-based Pap Smear, Screening    CBC & Differential    Comprehensive Metabolic Panel    Folate    Insulin, Total    Lipid Panel    T3, Uptake    T4    TSH    Vitamin B12    Vitamin D 25 Hydroxy   2. Menorrhagia with regular cycle  CBC & Differential    Comprehensive Metabolic Panel    Folate    Insulin, Total    Lipid Panel    T3, Uptake    T4    TSH    Vitamin B12    Vitamin D 25 Hydroxy   3. Dysmenorrhea  CBC & Differential    Comprehensive Metabolic Panel    Folate    Insulin, Total    Lipid Panel    T3, Uptake    T4    TSH    Vitamin B12    Vitamin D 25 Hydroxy   4. Second degree uterine prolapse     5. Lethargy  CBC & Differential    Comprehensive Metabolic Panel    Folate    Insulin, Total    Lipid Panel    T3, Uptake    T4    TSH    Vitamin B12    Vitamin D 25 Hydroxy   6. Dysthymic  CBC & Differential    Comprehensive Metabolic Panel    Folate    Insulin, Total    Lipid Panel    T3, Uptake    T4    TSH    Vitamin B12    Vitamin D 25 Hydroxy   7. Overweight (BMI 25.0-29.9)           Plan         New Medications Ordered This Visit   Medications   • Tranexamic Acid 650 MG tablet     Sig: Take 2 tablets by mouth 3 (Three) Times a Day. For 5 days each cycle     Dispense:  30 tablet     Refill:  12     1. Lysteda with each menses.  2. Check fasting blood work and urinalysis.  3. Recommended vitamin D3 and B12 supplementation.  4. Encouraged in diet and exercise.  5. Handouts on depression, hot flashes, exercise, and vitamin use.   6. Follow-up in 4 weeks.  Follow-up sooner as needed.            This document has been electronically signed by Omero Heath MD on February 26, 2019 10:14 PM      good balance